# Patient Record
Sex: FEMALE | Race: WHITE | NOT HISPANIC OR LATINO | URBAN - NONMETROPOLITAN AREA
[De-identification: names, ages, dates, MRNs, and addresses within clinical notes are randomized per-mention and may not be internally consistent; named-entity substitution may affect disease eponyms.]

---

## 2020-03-10 ENCOUNTER — IMPORTED ENCOUNTER (OUTPATIENT)
Dept: URBAN - NONMETROPOLITAN AREA CLINIC 1 | Facility: CLINIC | Age: 55
End: 2020-03-10

## 2020-03-10 PROBLEM — H52.222: Noted: 2020-03-10

## 2020-03-10 PROBLEM — H52.4: Noted: 2020-03-10

## 2020-03-10 PROBLEM — H04.123: Noted: 2020-03-10

## 2020-03-10 PROBLEM — H52.11: Noted: 2020-03-10

## 2020-03-10 PROBLEM — H16.223: Noted: 2020-03-10

## 2020-03-10 PROBLEM — H52.12: Noted: 2020-03-10

## 2020-03-10 PROCEDURE — 92310 CONTACT LENS FITTING OU: CPT

## 2020-03-10 PROCEDURE — 92014 COMPRE OPH EXAM EST PT 1/>: CPT

## 2020-03-10 PROCEDURE — 92015 DETERMINE REFRACTIVE STATE: CPT

## 2020-03-10 NOTE — PATIENT DISCUSSION
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   Insurance does not cover Restasis or Xiidra at this time

## 2021-12-08 ENCOUNTER — IMPORTED ENCOUNTER (OUTPATIENT)
Dept: URBAN - NONMETROPOLITAN AREA CLINIC 1 | Facility: CLINIC | Age: 56
End: 2021-12-08

## 2021-12-08 PROCEDURE — 92015 DETERMINE REFRACTIVE STATE: CPT

## 2021-12-08 PROCEDURE — 92014 COMPRE OPH EXAM EST PT 1/>: CPT

## 2021-12-08 PROCEDURE — 92310 CONTACT LENS FITTING OU: CPT

## 2021-12-08 NOTE — PATIENT DISCUSSION
Simple Hyperopia OD/Simple Astigmatism OS w/Presbyopia-  discussed findings w/patient-  new spectacle Rx issued-  CL trials and Rx issued to patient -  patient to call or come in adjustments are needed-  monitor yearly or prn CARMENZA OU -  discussed findings w/patient-  continue Refresh Optive QID OU -  monitor yearly or prn; 's Notes:  12/8/2021DFE 12/8/2021DES: started [facility] 10/25/2018 for 2 weeks will likely start on Restasis once it is generic.   Insurance does not cover Restasis or Xiidra at this time

## 2022-04-15 ASSESSMENT — TONOMETRY
OD_IOP_MMHG: 13
OS_IOP_MMHG: 15
OS_IOP_MMHG: 14
OD_IOP_MMHG: 15

## 2022-04-15 ASSESSMENT — VISUAL ACUITY
OD_SC: 20/20
OU_SC: 20/20
OS_SC: 20/25+2

## 2023-01-26 ENCOUNTER — ESTABLISHED PATIENT (OUTPATIENT)
Dept: URBAN - NONMETROPOLITAN AREA CLINIC 4 | Facility: CLINIC | Age: 58
End: 2023-01-26

## 2023-01-26 DIAGNOSIS — H52.11: ICD-10-CM

## 2023-01-26 PROCEDURE — 92015 DETERMINE REFRACTIVE STATE: CPT

## 2023-01-26 PROCEDURE — 92014 COMPRE OPH EXAM EST PT 1/>: CPT

## 2023-01-26 ASSESSMENT — TONOMETRY
OS_IOP_MMHG: 16
OD_IOP_MMHG: 16

## 2023-01-26 ASSESSMENT — VISUAL ACUITY
OU_SC: 20/25
OS_SC: 20/25
OD_SC: 20/30

## 2024-07-15 ENCOUNTER — COMPREHENSIVE EXAM (OUTPATIENT)
Dept: URBAN - NONMETROPOLITAN AREA CLINIC 4 | Facility: CLINIC | Age: 59
End: 2024-07-15

## 2024-07-15 DIAGNOSIS — Z98.890: ICD-10-CM

## 2024-07-15 DIAGNOSIS — H25.813: ICD-10-CM

## 2024-07-15 DIAGNOSIS — H52.12: ICD-10-CM

## 2024-07-15 DIAGNOSIS — H16.223: ICD-10-CM

## 2024-07-15 DIAGNOSIS — H52.4: ICD-10-CM

## 2024-07-15 DIAGNOSIS — H00.11: ICD-10-CM

## 2024-07-15 DIAGNOSIS — H52.222: ICD-10-CM

## 2024-07-15 PROCEDURE — 92015 DETERMINE REFRACTIVE STATE: CPT

## 2024-07-15 PROCEDURE — 92014 COMPRE OPH EXAM EST PT 1/>: CPT

## 2024-07-15 ASSESSMENT — VISUAL ACUITY
OS_CC: 20/20
OD_BAT: 20/40-2
OD_CC: 20/20

## 2024-07-15 ASSESSMENT — TONOMETRY
OD_IOP_MMHG: 13
OS_IOP_MMHG: 13

## 2025-07-17 ENCOUNTER — COMPREHENSIVE EXAM (OUTPATIENT)
Age: 60
End: 2025-07-17

## 2025-07-17 DIAGNOSIS — H16.223: ICD-10-CM

## 2025-07-17 DIAGNOSIS — H25.813: ICD-10-CM

## 2025-07-17 DIAGNOSIS — H52.4: ICD-10-CM

## 2025-07-17 DIAGNOSIS — H52.12: ICD-10-CM

## 2025-07-17 DIAGNOSIS — H52.222: ICD-10-CM

## 2025-07-17 PROCEDURE — 92015 DETERMINE REFRACTIVE STATE: CPT

## 2025-07-17 PROCEDURE — 92014 COMPRE OPH EXAM EST PT 1/>: CPT
